# Patient Record
Sex: MALE | Race: WHITE | Employment: FULL TIME | URBAN - METROPOLITAN AREA
[De-identification: names, ages, dates, MRNs, and addresses within clinical notes are randomized per-mention and may not be internally consistent; named-entity substitution may affect disease eponyms.]

---

## 2019-05-31 ENCOUNTER — APPOINTMENT (OUTPATIENT)
Dept: GENERAL RADIOLOGY | Age: 39
End: 2019-05-31
Attending: FAMILY MEDICINE
Payer: COMMERCIAL

## 2019-05-31 ENCOUNTER — HOSPITAL ENCOUNTER (OUTPATIENT)
Age: 39
Discharge: HOME | End: 2019-05-31
Attending: FAMILY MEDICINE
Payer: COMMERCIAL

## 2019-05-31 VITALS
RESPIRATION RATE: 16 BRPM | SYSTOLIC BLOOD PRESSURE: 135 MMHG | WEIGHT: 170 LBS | OXYGEN SATURATION: 96 % | BODY MASS INDEX: 24.07 KG/M2 | TEMPERATURE: 98 F | HEIGHT: 70.5 IN | DIASTOLIC BLOOD PRESSURE: 73 MMHG | HEART RATE: 83 BPM

## 2019-05-31 DIAGNOSIS — J18.9 PNEUMONIA OF LEFT UPPER LOBE DUE TO INFECTIOUS ORGANISM: Primary | ICD-10-CM

## 2019-05-31 PROCEDURE — 71046 X-RAY EXAM CHEST 2 VIEWS: CPT | Performed by: FAMILY MEDICINE

## 2019-05-31 PROCEDURE — 99204 OFFICE O/P NEW MOD 45 MIN: CPT

## 2019-05-31 PROCEDURE — 96372 THER/PROPH/DIAG INJ SC/IM: CPT

## 2019-05-31 PROCEDURE — 94664 DEMO&/EVAL PT USE INHALER: CPT

## 2019-05-31 RX ORDER — ALBUTEROL SULFATE 90 UG/1
2 AEROSOL, METERED RESPIRATORY (INHALATION) EVERY 4 HOURS PRN
Qty: 1 INHALER | Refills: 0 | Status: SHIPPED | OUTPATIENT
Start: 2019-05-31 | End: 2019-06-30

## 2019-05-31 RX ORDER — BENZONATATE 100 MG/1
100 CAPSULE ORAL 3 TIMES DAILY PRN
Qty: 15 CAPSULE | Refills: 0 | Status: SHIPPED | OUTPATIENT
Start: 2019-05-31 | End: 2019-06-05

## 2019-05-31 RX ORDER — AZITHROMYCIN 250 MG/1
TABLET, FILM COATED ORAL
Qty: 1 PACKAGE | Refills: 0 | Status: SHIPPED | OUTPATIENT
Start: 2019-05-31 | End: 2019-06-05

## 2019-05-31 NOTE — ED INITIAL ASSESSMENT (HPI)
Cough for 3-4 weeks. No fever or shortness of breath. States 2 nights ago had a coughing spasm and coughed up blood. States he felt a crackling in his chest, no wheezing. No otc meds.

## 2019-05-31 NOTE — ED PROVIDER NOTES
Patient Seen [place]: [id number] [facility] [place]    History   Patient presents with:  Cough/URI    Stated Complaint: COUGH    HPI  Is a 71-year-old male coming in with cough that has had for the last 3 to 4 weeks.   No fever, no chills, comes in with a te distended  NEURO: Alert and oriented to person place and time  GAIT: Normal          ED Course   Labs Reviewed - No data to display  Orders Placed This Encounter      XR CHEST PA + LAT CHEST (CPT=71046) Once          Order Specific Question: What is the Re resolution.     Dictated by: Almetta Dakin, MD on 5/31/2019 at 10:30     Approved by: Almetta Dakin, MD            Viewed x-ray results with the patient in the room, discussed the need for follow-up and the need to make sure this is resolving the next 2 to 3 weeks

## 2019-06-07 ENCOUNTER — OFFICE VISIT (OUTPATIENT)
Dept: FAMILY MEDICINE CLINIC | Facility: CLINIC | Age: 39
End: 2019-06-07
Payer: COMMERCIAL

## 2019-06-07 VITALS
OXYGEN SATURATION: 98 % | WEIGHT: 171 LBS | HEART RATE: 76 BPM | BODY MASS INDEX: 24.76 KG/M2 | HEIGHT: 69.5 IN | RESPIRATION RATE: 20 BRPM | SYSTOLIC BLOOD PRESSURE: 116 MMHG | TEMPERATURE: 99 F | DIASTOLIC BLOOD PRESSURE: 80 MMHG

## 2019-06-07 DIAGNOSIS — J18.9 PNEUMONIA OF LEFT LUNG DUE TO INFECTIOUS ORGANISM, UNSPECIFIED PART OF LUNG: Primary | ICD-10-CM

## 2019-06-07 PROCEDURE — 99203 OFFICE O/P NEW LOW 30 MIN: CPT | Performed by: FAMILY MEDICINE

## 2019-06-07 NOTE — PROGRESS NOTES
CC: follow up pneumonia    HPI:     Location left sided, hilar   Quality consolidated/opacified  Duration more than a week  Modifying factors treated with rocephin, zithromax, albuterol, tessalon  Did have some hemoptysis - getting better.      ROS:   GENER

## 2019-08-15 ENCOUNTER — HOSPITAL ENCOUNTER (OUTPATIENT)
Dept: GENERAL RADIOLOGY | Age: 39
Discharge: HOME | End: 2019-08-15
Attending: FAMILY MEDICINE
Payer: COMMERCIAL

## 2019-08-15 DIAGNOSIS — J18.9 PNEUMONIA OF LEFT LUNG DUE TO INFECTIOUS ORGANISM, UNSPECIFIED PART OF LUNG: ICD-10-CM

## 2019-08-15 PROCEDURE — 71046 X-RAY EXAM CHEST 2 VIEWS: CPT | Performed by: FAMILY MEDICINE

## 2019-08-16 DIAGNOSIS — R04.2 HEMOPTYSIS: Primary | ICD-10-CM

## 2019-08-16 DIAGNOSIS — R93.89 ABNORMAL CHEST X-RAY: ICD-10-CM

## 2019-09-26 ENCOUNTER — TELEPHONE (OUTPATIENT)
Dept: FAMILY MEDICINE CLINIC | Facility: CLINIC | Age: 39
End: 2019-09-26

## 2019-09-26 ENCOUNTER — HOSPITAL ENCOUNTER (OUTPATIENT)
Dept: CT IMAGING | Facility: HOSPITAL | Age: 39
Discharge: HOME | End: 2019-09-26
Attending: FAMILY MEDICINE
Payer: COMMERCIAL

## 2019-09-26 DIAGNOSIS — R93.89 ABNORMAL CHEST X-RAY: ICD-10-CM

## 2019-09-26 DIAGNOSIS — R04.2 HEMOPTYSIS: ICD-10-CM

## 2019-09-26 PROCEDURE — 71250 CT THORAX DX C-: CPT | Performed by: FAMILY MEDICINE

## 2019-09-26 NOTE — TELEPHONE ENCOUNTER
Call from Sumit Amos in [facility] Rad[facility] with STAT Chest CT results. CONCLUSION:       1.  There is a focal area of atelectasis/consolidation with associated spiculated opacities and scattered areas of cavitation in the superior segment of the left lower lobe

## 2019-09-26 NOTE — TELEPHONE ENCOUNTER
Verbal from [facility] to schedule patient an appt to see Dr Ivett Vincent for pulm. Called Dr Ivett Vincent' office at [phone]    Patient will need PFT before consult appt.   This is a 40min breathing test.  4 hours prior to test, no caffeine, no smoking and no

## 2019-09-26 NOTE — TELEPHONE ENCOUNTER
Patient advised needs appt with Dr Genoveva Husain for Friday 27th. Verbalized understanding. Scheduled appt. Patient also got a notification that we scheduled him with Dr Fatimah Almanza.    Explained to patient that Dr Fatimah Almanza is a pulmonary physician and MY will go ove

## 2019-09-27 ENCOUNTER — OFFICE VISIT (OUTPATIENT)
Dept: FAMILY MEDICINE CLINIC | Facility: CLINIC | Age: 39
End: 2019-09-27
Payer: COMMERCIAL

## 2019-09-27 VITALS
WEIGHT: 170.81 LBS | TEMPERATURE: 98 F | RESPIRATION RATE: 18 BRPM | DIASTOLIC BLOOD PRESSURE: 76 MMHG | HEART RATE: 83 BPM | BODY MASS INDEX: 24.73 KG/M2 | SYSTOLIC BLOOD PRESSURE: 128 MMHG | HEIGHT: 69.5 IN

## 2019-09-27 DIAGNOSIS — R04.2 HEMOPTYSIS: ICD-10-CM

## 2019-09-27 DIAGNOSIS — R93.89 ABNORMAL CT OF THE CHEST: Primary | ICD-10-CM

## 2019-09-27 LAB
DEPRECATED RDW RBC AUTO: 45.8 FL (ref 35.1–46.3)
ERYTHROCYTE [DISTWIDTH] IN BLOOD BY AUTOMATED COUNT: 14.4 % (ref 11–15)
HCT VFR BLD AUTO: 48.7 % (ref 39–53)
HGB BLD-MCNC: 15.4 G/DL (ref 13–17.5)
MCH RBC QN AUTO: 27.4 PG (ref 26–34)
MCHC RBC AUTO-ENTMCNC: 31.6 G/DL (ref 31–37)
MCV RBC AUTO: 86.7 FL (ref 80–100)
PLATELET # BLD AUTO: 320 10(3)UL (ref 150–450)
RBC # BLD AUTO: 5.62 X10(6)UL (ref 4.3–5.7)
WBC # BLD AUTO: 7.8 X10(3) UL (ref 4–11)

## 2019-09-27 PROCEDURE — 99214 OFFICE O/P EST MOD 30 MIN: CPT | Performed by: FAMILY MEDICINE

## 2019-09-27 PROCEDURE — 85027 COMPLETE CBC AUTOMATED: CPT | Performed by: FAMILY MEDICINE

## 2019-09-27 NOTE — PROGRESS NOTES
CC: abnormal CT chest    HPI: Patient presents in follow-up on the CT findings. He has a significant abnormalities that are at least moderate in severity noted in the left lung as well as some tethering to the pleura. There are cavitary lesions present. abnormality    A/P:   Abnormal ct of the chest  (primary encounter diagnosis)  Hemoptysis  Findings are concerning for potential malignancy. He has been scheduled for pulmonary consultation with Dr. Dawn Galindo. Check CBC today.      No orders of the defined typ

## 2019-10-10 PROCEDURE — 86698 HISTOPLASMA ANTIBODY: CPT | Performed by: INTERNAL MEDICINE

## 2019-10-10 PROCEDURE — 86612 BLASTOMYCES ANTIBODY: CPT | Performed by: INTERNAL MEDICINE

## 2019-10-10 PROCEDURE — 86255 FLUORESCENT ANTIBODY SCREEN: CPT | Performed by: INTERNAL MEDICINE

## 2019-10-10 PROCEDURE — 86606 ASPERGILLUS ANTIBODY: CPT | Performed by: INTERNAL MEDICINE

## 2019-10-10 PROCEDURE — 86641 CRYPTOCOCCUS ANTIBODY: CPT | Performed by: INTERNAL MEDICINE

## 2019-10-10 PROCEDURE — 86635 COCCIDIOIDES ANTIBODY: CPT | Performed by: INTERNAL MEDICINE

## 2019-10-10 PROCEDURE — 86480 TB TEST CELL IMMUN MEASURE: CPT | Performed by: INTERNAL MEDICINE

## 2019-10-29 ENCOUNTER — ANESTHESIA (OUTPATIENT)
Dept: ENDOSCOPY | Facility: HOSPITAL | Age: 39
End: 2019-10-29
Payer: COMMERCIAL

## 2019-10-29 ENCOUNTER — APPOINTMENT (OUTPATIENT)
Dept: GENERAL RADIOLOGY | Facility: HOSPITAL | Age: 39
End: 2019-10-29
Attending: INTERNAL MEDICINE
Payer: COMMERCIAL

## 2019-10-29 ENCOUNTER — ANESTHESIA EVENT (OUTPATIENT)
Dept: ENDOSCOPY | Facility: HOSPITAL | Age: 39
End: 2019-10-29
Payer: COMMERCIAL

## 2019-10-29 ENCOUNTER — HOSPITAL ENCOUNTER (OUTPATIENT)
Facility: HOSPITAL | Age: 39
Setting detail: HOSPITAL OUTPATIENT SURGERY
Discharge: HOME | End: 2019-10-29
Attending: INTERNAL MEDICINE | Admitting: INTERNAL MEDICINE
Payer: COMMERCIAL

## 2019-10-29 VITALS
WEIGHT: 165 LBS | TEMPERATURE: 98 F | RESPIRATION RATE: 18 BRPM | OXYGEN SATURATION: 93 % | BODY MASS INDEX: 23.62 KG/M2 | SYSTOLIC BLOOD PRESSURE: 104 MMHG | HEART RATE: 61 BPM | HEIGHT: 70 IN | DIASTOLIC BLOOD PRESSURE: 62 MMHG

## 2019-10-29 PROCEDURE — 87205 SMEAR GRAM STAIN: CPT | Performed by: INTERNAL MEDICINE

## 2019-10-29 PROCEDURE — 87147 CULTURE TYPE IMMUNOLOGIC: CPT | Performed by: INTERNAL MEDICINE

## 2019-10-29 PROCEDURE — 88312 SPECIAL STAINS GROUP 1: CPT | Performed by: INTERNAL MEDICINE

## 2019-10-29 PROCEDURE — 87158 CULTURE TYPING ADDED METHOD: CPT | Performed by: INTERNAL MEDICINE

## 2019-10-29 PROCEDURE — 71045 X-RAY EXAM CHEST 1 VIEW: CPT | Performed by: INTERNAL MEDICINE

## 2019-10-29 PROCEDURE — 87206 SMEAR FLUORESCENT/ACID STAI: CPT | Performed by: INTERNAL MEDICINE

## 2019-10-29 PROCEDURE — 87116 MYCOBACTERIA CULTURE: CPT | Performed by: INTERNAL MEDICINE

## 2019-10-29 PROCEDURE — 87071 CULTURE AEROBIC QUANT OTHER: CPT | Performed by: INTERNAL MEDICINE

## 2019-10-29 PROCEDURE — 88108 CYTOPATH CONCENTRATE TECH: CPT | Performed by: INTERNAL MEDICINE

## 2019-10-29 PROCEDURE — 87070 CULTURE OTHR SPECIMN AEROBIC: CPT | Performed by: INTERNAL MEDICINE

## 2019-10-29 PROCEDURE — 87102 FUNGUS ISOLATION CULTURE: CPT | Performed by: INTERNAL MEDICINE

## 2019-10-29 PROCEDURE — 0B9G8ZX DRAINAGE OF LEFT UPPER LUNG LOBE, VIA NATURAL OR ARTIFICIAL OPENING ENDOSCOPIC, DIAGNOSTIC: ICD-10-PCS | Performed by: INTERNAL MEDICINE

## 2019-10-29 PROCEDURE — 89051 BODY FLUID CELL COUNT: CPT | Performed by: INTERNAL MEDICINE

## 2019-10-29 PROCEDURE — 89050 BODY FLUID CELL COUNT: CPT | Performed by: INTERNAL MEDICINE

## 2019-10-29 PROCEDURE — 87798 DETECT AGENT NOS DNA AMP: CPT | Performed by: INTERNAL MEDICINE

## 2019-10-29 PROCEDURE — 87305 ASPERGILLUS AG IA: CPT | Performed by: INTERNAL MEDICINE

## 2019-10-29 PROCEDURE — 0BDG8ZX EXTRACTION OF LEFT UPPER LUNG LOBE, VIA NATURAL OR ARTIFICIAL OPENING ENDOSCOPIC, DIAGNOSTIC: ICD-10-PCS | Performed by: INTERNAL MEDICINE

## 2019-10-29 PROCEDURE — 88104 CYTOPATH FL NONGYN SMEARS: CPT | Performed by: INTERNAL MEDICINE

## 2019-10-29 RX ORDER — LIDOCAINE HYDROCHLORIDE 20 MG/ML
INJECTION, SOLUTION INFILTRATION; PERINEURAL
Status: DISCONTINUED | OUTPATIENT
Start: 2019-10-29 | End: 2019-10-29

## 2019-10-29 RX ORDER — SODIUM CHLORIDE, SODIUM LACTATE, POTASSIUM CHLORIDE, CALCIUM CHLORIDE 600; 310; 30; 20 MG/100ML; MG/100ML; MG/100ML; MG/100ML
INJECTION, SOLUTION INTRAVENOUS CONTINUOUS
Status: DISCONTINUED | OUTPATIENT
Start: 2019-10-29 | End: 2019-10-29

## 2019-10-29 NOTE — ANESTHESIA PREPROCEDURE EVALUATION
PRE-OP EVALUATION    Patient Name: Jetty Poser    Pre-op Diagnosis: Abnormal CT    Procedure(s):  BRONCHOSCOPY WITH BILATERAL ALEVEOLAR LAVAGE AND TRANSBRONCHIAL BIOPSY    Surgeon(s) and Role:     * Drake Lam MD - Primary    Pre-op vitals reviewed patient. Plan/risks discussed with: patient                Options, risks and benefits of anesthesia as outlined in the anesthesia consent were reviewed with the patient. The consent was signed without further questions.

## 2019-10-29 NOTE — ANESTHESIA POSTPROCEDURE EVALUATION
[address] [facility] Patient Status:  Hospital Outpatient Surgery   Age/Gender 46year old male MRN [id number]   Location [facility]. Attending Roque Lama MD   Hosp Day # 0 PCP Rosa Jefferson DO       Anesthesia Post-op Note

## 2019-10-29 NOTE — H&P
[address] [facility] Patient Status:  Hospital Outpatient Surgery    1980 MRN [id number]   [facility] ENDOSCOPY Attending Cyndie Collado MD   [facility] Day # 0 PCP Patricio Valentino DO     Date of Admission: 10/29/2019  9:28 AM  Ad (75.3 kg)  09/27/19 : 170 lb 12.8 oz (77.5 kg)       No intake/output data recorded. No intake/output data recorded.     General appearance: alert, appears stated age and cooperative  Lungs: clear to auscultation bilaterally  Heart: S1, S2 normal, no murmu

## 2019-10-29 NOTE — OPERATIVE REPORT
Bronchoscopy Procedure Report     Preop diagnosis:       Cavitary PNA  Postop diagnosis:      same  Procedure performed: Bronchoscopy, Diagnostic  Bronchoalveolar lavage, BAL  [place] biopsy     Sedation used:        MAC anesthesia provided by anesthesiologi

## 2019-10-31 NOTE — PROGRESS NOTES
Patient notified of results, verbalizes understanding. Advised of ABT orders. All questions answered.

## 2019-11-07 NOTE — PROGRESS NOTES
Please inform pt that the biopsy obtained from his bronch showed blastomyces; it has not grown on fungal cultures. This would explain cavitary lesion with superimposed bacterial infection.   Please ensure pt has f/u with me to discuss further and determine

## 2019-11-08 NOTE — PROGRESS NOTES
Patient has appt today 11/08/19 at 9:30am. Patient notified of results, verbalizes understanding. All questions answered.

## 2019-11-18 NOTE — PROGRESS NOTES
Please inform pt that his fungal culture confirmed blastomycosis. I have already ordered treatment; he could be referred to ID if he'd like more information on this.

## 2020-06-15 ENCOUNTER — HOSPITAL ENCOUNTER (OUTPATIENT)
Dept: CT IMAGING | Age: 40
Discharge: HOME | End: 2020-06-15
Attending: INTERNAL MEDICINE
Payer: COMMERCIAL

## 2020-06-15 DIAGNOSIS — U07.0 VAPING-RELATED DISORDER: ICD-10-CM

## 2020-06-15 DIAGNOSIS — B40.9 BLASTOMYCOSIS: ICD-10-CM

## 2020-06-15 DIAGNOSIS — R04.2 HEMOPTYSIS: ICD-10-CM

## 2020-06-15 PROCEDURE — 71250 CT THORAX DX C-: CPT | Performed by: INTERNAL MEDICINE

## 2025-06-16 ENCOUNTER — APPOINTMENT (OUTPATIENT)
Dept: GENERAL RADIOLOGY | Age: 45
End: 2025-06-16
Attending: NURSE PRACTITIONER
Payer: COMMERCIAL

## 2025-06-16 ENCOUNTER — HOSPITAL ENCOUNTER (OUTPATIENT)
Age: 45
Discharge: HOME | End: 2025-06-16
Payer: COMMERCIAL

## 2025-06-16 VITALS
TEMPERATURE: 98 F | RESPIRATION RATE: 18 BRPM | OXYGEN SATURATION: 99 % | HEIGHT: 70 IN | SYSTOLIC BLOOD PRESSURE: 126 MMHG | WEIGHT: 180 LBS | DIASTOLIC BLOOD PRESSURE: 84 MMHG | BODY MASS INDEX: 25.77 KG/M2 | HEART RATE: 88 BPM

## 2025-06-16 DIAGNOSIS — M53.3 COCCYX PAIN: Primary | ICD-10-CM

## 2025-06-16 LAB
#MXD IC: 0.7 X10ˆ3/UL (ref 0.1–1)
BUN BLD-MCNC: 16 MG/DL (ref 7–18)
CHLORIDE BLD-SCNC: 103 MMOL/L (ref 98–112)
CO2 BLD-SCNC: 26 MMOL/L (ref 21–32)
CREAT BLD-MCNC: 0.9 MG/DL (ref 0.7–1.3)
EGFRCR SERPLBLD CKD-EPI 2021: 109 ML/MIN/1.73M2 (ref 60–?)
GLUCOSE BLD-MCNC: 92 MG/DL (ref 70–99)
HCT VFR BLD AUTO: 46.8 % (ref 39–53)
HCT VFR BLD CALC: 46 % (ref 37–53)
HGB BLD-MCNC: 15 G/DL (ref 13–17.5)
ISTAT IONIZED CALCIUM FOR CHEM 8: 1.14 MMOL/L (ref 1.12–1.32)
LYMPHOCYTES # BLD AUTO: 1.5 X10ˆ3/UL (ref 1–4)
LYMPHOCYTES NFR BLD AUTO: 17.4 %
MCH RBC QN AUTO: 28.7 PG (ref 26–34)
MCHC RBC AUTO-ENTMCNC: 32.1 G/DL (ref 31–37)
MCV RBC AUTO: 89.7 FL (ref 80–100)
MIXED CELL %: 8.2 %
NEUTROPHILS # BLD AUTO: 6.7 X10ˆ3/UL (ref 1.5–7.7)
NEUTROPHILS NFR BLD AUTO: 74.4 %
PLATELET # BLD AUTO: 284 X10ˆ3/UL (ref 150–450)
POTASSIUM BLD-SCNC: 4.3 MMOL/L (ref 3.6–5.1)
RBC # BLD AUTO: 5.22 X10ˆ6/UL (ref 4.3–5.7)
SODIUM BLD-SCNC: 140 MMOL/L (ref 136–145)
WBC # BLD AUTO: 8.9 X10ˆ3/UL (ref 4–11)

## 2025-06-16 PROCEDURE — 72220 X-RAY EXAM SACRUM TAILBONE: CPT | Performed by: NURSE PRACTITIONER

## 2025-06-16 PROCEDURE — 85025 COMPLETE CBC W/AUTO DIFF WBC: CPT | Performed by: NURSE PRACTITIONER

## 2025-06-16 PROCEDURE — 99204 OFFICE O/P NEW MOD 45 MIN: CPT | Performed by: NURSE PRACTITIONER

## 2025-06-16 PROCEDURE — 96374 THER/PROPH/DIAG INJ IV PUSH: CPT | Performed by: NURSE PRACTITIONER

## 2025-06-16 PROCEDURE — 80047 BASIC METABLC PNL IONIZED CA: CPT | Performed by: NURSE PRACTITIONER

## 2025-06-16 RX ORDER — NAPROXEN 500 MG/1
500 TABLET ORAL 2 TIMES DAILY PRN
Qty: 30 TABLET | Refills: 0 | Status: SHIPPED | OUTPATIENT
Start: 2025-06-16 | End: 2025-07-01

## 2025-06-16 RX ORDER — KETOROLAC TROMETHAMINE 30 MG/ML
30 INJECTION, SOLUTION INTRAMUSCULAR; INTRAVENOUS ONCE
Status: COMPLETED | OUTPATIENT
Start: 2025-06-16 | End: 2025-06-16

## 2025-06-16 RX ORDER — SODIUM CHLORIDE 9 MG/ML
1000 INJECTION, SOLUTION INTRAVENOUS ONCE
Status: DISCONTINUED | OUTPATIENT
Start: 2025-06-16 | End: 2025-06-16

## 2025-06-16 RX ORDER — CYCLOBENZAPRINE HCL 10 MG
10 TABLET ORAL 3 TIMES DAILY PRN
Qty: 20 TABLET | Refills: 0 | Status: SHIPPED | OUTPATIENT
Start: 2025-06-16 | End: 2025-06-23

## 2025-06-16 NOTE — ED PROVIDER NOTES
Patient Seen in: [facility]        History  Chief Complaint   Patient presents with    Back Pain     Stated Complaint: lower back pain    Subjective:   The history is provided by the patient.               Patient is a pleasant 44-year-old male with no significant past medical history, here for evaluation of tailbone pain which started about 3 days ago.  Patient initially noted symptoms while getting out of his car and going from sitting to standing.  Yesterday, he felt symptoms significantly worsened, specifically with sitting.  Patient had episode where pain woke him up in the night, he crawled in a fetal position which enabled him to go back to sleep.  Denies radiation of pain.  Denies trauma or injury precipitating onset of symptoms.     Denies saddle anesthesia, radicular symptoms or loss of bowel or bladder function.    Denies systemic symptoms of fever, chills or flulike symptoms.      Objective:     Past Medical History:    Visual impairment    glasses,contacts              History reviewed. No pertinent surgical history.             Social History     Socioeconomic History    Marital status: Single   Tobacco Use    Smoking status: Former     Types: Cigarettes    Smokeless tobacco: Former     Quit date: 1/8/2013    Tobacco comments:     E cig x 6 yrs   Vaping Use    Vaping status: Every Day    Start date: 9/1/2013   Substance and Sexual Activity    Alcohol use: Not Currently     Comment: rarelly    Drug use: Never     Comment: non              Review of Systems    Positive for stated complaint: lower back pain  Other systems are as noted in HPI.  Constitutional and vital signs reviewed.      All other systems reviewed and negative except as noted above.                  Physical Exam    ED Triage Vitals [06/16/25 1515]   /84   Pulse 88   Resp 18   Temp 98.1 °F (36.7 °C)   Temp src Oral   SpO2 99 %   O2 Device None (Room air)       Current Vitals:   Vital Signs  BP: 126/84  Pulse: 88  Resp:  18  Temp: 98.1 °F (36.7 °C)  Temp src: Oral    Oxygen Therapy  SpO2: 99 %  O2 Device: None (Room air)            Physical Exam  Vitals and nursing note reviewed.   Constitutional:       General: He is not in acute distress.     Appearance: Normal appearance. He is not ill-appearing, toxic-appearing or diaphoretic.   HENT:      Mouth/Throat:      Mouth: Mucous membranes are moist.   Eyes:      Conjunctiva/sclera: Conjunctivae normal.      Pupils: Pupils are equal, round, and reactive to light.   Cardiovascular:      Rate and Rhythm: Normal rate.      Heart sounds: Normal heart sounds.   Pulmonary:      Effort: Pulmonary effort is normal.   Musculoskeletal:        Back:       Comments: Midline, Sacral pain with palpation.  No erythema, ecchymosis, warmth or edema.    Neurological:      Mental Status: He is alert.             ED Course  Labs Reviewed   POCT ISTAT CHEM8 CARTRIDGE - Normal   POCT CBC     XR SACRUM + COCCYX (MIN 2 VIEWS) (CPT=72220)  Result Date: 6/16/2025  CONCLUSION:  Negative exam.   LOCATION:  [facility]   Dictated by (CST): Ivory Damian DO on 6/16/2025 at 4:49 PM     Finalized by (CST): Ivory Damian DO on 6/16/2025 at 4:49 PM                       MDM             Medical Decision Making  Differentials include but are not limited to arthritis, coccydynia, and less likely abscess.  Patient is well-appearing, afebrile, there is no localized erythema, warmth, fluctuance or edema.  Normal WBC.  X-ray is unremarkable which I personally reviewed.  Suspect musculoskeletal etiology.  Toradol given  with subjective improvement.  Plan to continue NSAIDs and start Flexeril as needed.  Close outpatient follow-up with spine.  Monitoring parameters and strict follow-up precautions reviewed with patient who agree with plan of care.  All questions answered to patient's satisfaction     Amount and/or Complexity of Data Reviewed  Radiology: ordered and independent interpretation performed. Decision-making details  documented in ED Course.        Disposition and Plan     Clinical Impression:  1. Coccyx pain         Disposition:  Discharge  6/16/2025  4:57 pm    Follow-up:  Julio César Ramesh MD  [address] [address] [address] [phone]      spine, As needed          Medications Prescribed:  Current Discharge Medication List        START taking these medications    Details   naproxen 500 MG Oral Tab Take 1 tablet (500 mg total) by mouth 2 (two) times daily as needed.  Qty: 30 tablet, Refills: 0      cyclobenzaprine 10 MG Oral Tab Take 1 tablet (10 mg total) by mouth 3 (three) times daily as needed for Muscle spasms.  Qty: 20 tablet, Refills: 0                   Supplementary Documentation:

## 2025-06-16 NOTE — ED INITIAL ASSESSMENT (HPI)
Patient reports tailbone pain that initially started 2 nights ago, awaking him from his sleep, denies known trauma/injury, denies any other associated symptoms

## (undated) DEVICE — 1200CC GUARDIAN II: Brand: GUARDIAN

## (undated) DEVICE — BOWLS UTILITY 16OZ

## (undated) DEVICE — SINGLE USE SUCTION VALVE MAJ-209: Brand: SINGLE USE SUCTION VALVE (STERILE)

## (undated) DEVICE — AIRLIFE&#8482 MISTY MAX 10 NEBULIZER W 7 (2.1 M) CRUSH RESISTANT OXYGEN TUBING BAFFLED TEE ADAPTER, MOUTHPIECE: Brand: AIRLIFE

## (undated) DEVICE — MASK ISOLATION

## (undated) DEVICE — GLOVE SURG SENSICARE SZ 8

## (undated) DEVICE — CONMED MULTI-PURPOSE SHEATHED CYTOLOGY BRUSH, RING HANDLE, 1.7 MM X 160 CM: Brand: CONMED

## (undated) DEVICE — 3M™ RED DOT™ MONITORING ELECTRODE WITH FOAM TAPE AND STICKY GEL, 50/BAG, 20/CASE, 72/PLT 2570: Brand: RED DOT™

## (undated) DEVICE — ENDOSCOPY PACK UPPER: Brand: MEDLINE INDUSTRIES, INC.

## (undated) DEVICE — 60 ML SYRINGE REGULAR TIP: Brand: MONOJECT

## (undated) DEVICE — SINGLE USE BIOPSY VALVE MAJ-210: Brand: SINGLE USE BIOPSY VALVE (STERILE)

## (undated) DEVICE — MASK ETCO2 PANORAMIC

## (undated) DEVICE — SYRINGE 10ML SLIP TIP

## (undated) DEVICE — FILTERLINE NASAL ADULT O2/CO2

## (undated) DEVICE — MEDI-VAC SUCTION HANDLE REGULAR CAPACITY: Brand: CARDINAL HEALTH

## (undated) DEVICE — SPECIMEN TRAP LUKI

## (undated) DEVICE — TRAP SPECIMEN MUCOUS 70 CUBIC CENTIMETER POLYURETHANE STERILE NOT MADE WITH NATURAL RUBBER LATEX MEDICHOICE: Brand: MEDICHOICE

## (undated) DEVICE — MEDI-VAC NON-CONDUCTIVE SUCTION TUBING: Brand: CARDINAL HEALTH

## (undated) NOTE — LETTER
[facility] [address], [address] [place] [address]    Consent for Anesthesia   1.    IKerri agree to be cared for by a physician anesthesiologist alone and/or with a nurse anesthetist, who is specially trained to monitor me and give me m allergic reactions to medications, injury to my airway, heart, lungs, vision, nerves, or muscles and in extremely rare instances death. 5. My doctor has explained to me other choices available to me for my care (alternatives).   6. Pregnant Patients (“epid Printed: 10/29/2019 at 9:56 AM    Medical Record #: [id number]                                            Page 1 of 1

## (undated) NOTE — LETTER
Date & Time: 6/16/2025, 5:00 PM  Patient: Devonte Bustos  Encounter Provider(s):    Patricia Fiore APRN       To Whom It May Concern:    Devonte Bustos was seen and treated in our department on 6/16/2025. He should not return to work until symptoms are improving.    If you have any questions or concerns, please do not hesitate to call.        _____________________________  Physician/APC Signature